# Patient Record
Sex: FEMALE | Race: BLACK OR AFRICAN AMERICAN | NOT HISPANIC OR LATINO | URBAN - METROPOLITAN AREA
[De-identification: names, ages, dates, MRNs, and addresses within clinical notes are randomized per-mention and may not be internally consistent; named-entity substitution may affect disease eponyms.]

---

## 2017-06-30 ENCOUNTER — IMPORTED ENCOUNTER (OUTPATIENT)
Dept: URBAN - METROPOLITAN AREA CLINIC 1 | Facility: CLINIC | Age: 62
End: 2017-06-30

## 2017-06-30 PROBLEM — H16.143: Noted: 2017-06-30

## 2017-06-30 PROBLEM — H25.813: Noted: 2017-06-30

## 2017-06-30 PROBLEM — H40.1131: Noted: 2017-06-30

## 2017-06-30 PROBLEM — H04.123: Noted: 2017-06-30

## 2017-06-30 PROCEDURE — 92014 COMPRE OPH EXAM EST PT 1/>: CPT

## 2017-06-30 PROCEDURE — 92015 DETERMINE REFRACTIVE STATE: CPT

## 2017-06-30 NOTE — PATIENT DISCUSSION
1.  Cataract OU: Observe for now without intervention. The patient was advised to contact us if any change or worsening of vision2. MICHAEL w/ PEK OU- Increase ATs to TID OU routinely 3. Mild Open Angle Glaucoma OU (CD 0.7 OU) IOP stable on current meds. Cont Latanoprost QHS OU. Compliance with meds. Letter to PCPMRx for glassesReturn for an appointment in 6 mo 10 dfe glare (Consider Phaco) with Dr. Jael Maynard.

## 2017-10-04 NOTE — PATIENT DISCUSSION
The patient feels that the cataract is significantly impacting daily activities and has elected cataract surgery. The risks, benefits, and alternatives to surgery were discussed. The patient elects to proceed with surgery.  Pt elects Symfony IOL goal david, reviewed blended offset, reviewed design of Symfony IOL and less than 1% failure of neuroadaptation requiring IOL xchange to Custom.

## 2017-10-04 NOTE — PATIENT DISCUSSION
Discussed collagen cross-linking. Unfortunately, this is not FDA approved within the [place] and is only available through studies.

## 2017-10-04 NOTE — PATIENT DISCUSSION
Advised that the presence of an epiretinal membrane may occasionally worsen or lead to macular edema following cataract surgery.

## 2017-10-04 NOTE — PATIENT DISCUSSION
Patient understands increased risk of persistent corneal edema after cataract surgery which may require corneal transplantation/endothelial keratoplasty.

## 2017-10-04 NOTE — PATIENT DISCUSSION
Certain medications including Flomax and other similar medications may cause the iris to become floppy during cataract surgery and may add complexity to the surgery and increase the risk for complications.

## 2017-10-04 NOTE — PATIENT DISCUSSION
Discussed the difficulty of selecting IOL's in previous refractive surgery patients and discussed the possibility of refractive surprise and possible IOL exchange., EpiLASEK for enhancement due to hx LASIK.

## 2017-12-08 ENCOUNTER — IMPORTED ENCOUNTER (OUTPATIENT)
Dept: URBAN - METROPOLITAN AREA CLINIC 1 | Facility: CLINIC | Age: 62
End: 2017-12-08

## 2017-12-08 PROBLEM — H40.1131: Noted: 2017-12-08

## 2017-12-08 PROBLEM — H40.1132: Noted: 2017-12-08

## 2017-12-08 PROBLEM — H25.813: Noted: 2017-12-08

## 2017-12-08 PROBLEM — H04.123: Noted: 2017-12-08

## 2017-12-08 PROBLEM — H16.143: Noted: 2017-12-08

## 2017-12-08 PROCEDURE — 92015 DETERMINE REFRACTIVE STATE: CPT

## 2017-12-08 PROCEDURE — 99214 OFFICE O/P EST MOD 30 MIN: CPT

## 2018-02-08 ENCOUNTER — IMPORTED ENCOUNTER (OUTPATIENT)
Dept: URBAN - METROPOLITAN AREA CLINIC 1 | Facility: CLINIC | Age: 63
End: 2018-02-08

## 2018-02-08 PROBLEM — H25.812: Noted: 2018-02-08

## 2018-02-08 PROCEDURE — 92136 OPHTHALMIC BIOMETRY: CPT

## 2018-02-08 NOTE — PATIENT DISCUSSION
Cataract OS: Visually Significant secondary to glare discussed the risks benefits alternatives and limitations of cataract surgery. The patient stated a full understanding and a desire to proceed with the procedure. The patient will need to start pre-operative eye drops as directed. Proceed w/ phaco PCL OS w/ iStentDiscussed with patient and patient understands halos around lights and glare are expected post-op particularly at night with the MF lens choice. Pt understood. Return for an appointment for sx OS with Dr. Dav Stone.

## 2018-02-14 ENCOUNTER — IMPORTED ENCOUNTER (OUTPATIENT)
Dept: URBAN - METROPOLITAN AREA CLINIC 1 | Facility: CLINIC | Age: 63
End: 2018-02-14

## 2018-02-15 ENCOUNTER — IMPORTED ENCOUNTER (OUTPATIENT)
Dept: URBAN - METROPOLITAN AREA CLINIC 1 | Facility: CLINIC | Age: 63
End: 2018-02-15

## 2018-02-15 PROBLEM — Z96.1: Noted: 2018-02-15

## 2018-02-15 PROCEDURE — 99024 POSTOP FOLLOW-UP VISIT: CPT

## 2018-02-15 NOTE — PATIENT DISCUSSION
POD#1 CE/IOL OS (Symfony MF- ZXROO w/ iStent) doing well. Continue all 3 gtts as prescribed and until gone. Use Lotemax BID OS Prolensa Qdaily OS Ocuflox TID OS Cont Latanoprost QHS OU Post op Warnings Reiterated RTC as scheduled

## 2018-02-22 ENCOUNTER — IMPORTED ENCOUNTER (OUTPATIENT)
Dept: URBAN - METROPOLITAN AREA CLINIC 1 | Facility: CLINIC | Age: 63
End: 2018-02-22

## 2018-02-22 PROBLEM — H25.811: Noted: 2018-02-22

## 2018-02-22 PROCEDURE — 92136 OPHTHALMIC BIOMETRY: CPT

## 2018-02-22 NOTE — PATIENT DISCUSSION
1.  Cataract OD: Visually Significant secondary to glare discussed the risks benefits alternatives and limitations of cataract surgery. The patient stated a full understanding and a desire to proceed with the procedure. Discussed with patient and patient understands halos around lights and glare are expected post-op particularly at night with the MF lens choice. Pt understood. Phaco PCL OD Symfony MF w/ iStent 2. POW#2  CE/IOL OS (Symfony MF- ZXROO w/ iStent) doing well.   Use Lotemax BID OS till out Use Prolensa Qdaily OS till out Cont Latanoprost QHS OU F/u as scheduled 2nd eye

## 2018-02-28 ENCOUNTER — IMPORTED ENCOUNTER (OUTPATIENT)
Dept: URBAN - METROPOLITAN AREA CLINIC 1 | Facility: CLINIC | Age: 63
End: 2018-02-28

## 2018-03-01 ENCOUNTER — IMPORTED ENCOUNTER (OUTPATIENT)
Dept: URBAN - METROPOLITAN AREA CLINIC 1 | Facility: CLINIC | Age: 63
End: 2018-03-01

## 2018-03-01 PROBLEM — Z96.1: Noted: 2018-03-01

## 2018-03-01 PROCEDURE — 99024 POSTOP FOLLOW-UP VISIT: CPT

## 2018-03-01 NOTE — PATIENT DISCUSSION
1. POD#1 CE/IOL Symfony ZXROO w/ iStent OD doing well. IOP improved. Continue Latanoprost OU QHSContinue all 3 gtts as prescribed and until gone. Ocuflox TIDLotemax BIDProlensa QDPost op Warnings Reiterated  2. POW#2  CE/IOL Symfony ZXROO w/ iStent OD OS doing well. Continue Lotemax BID until gone. Continue Prolensa QD until gone. 3.   RTC as scheduled

## 2018-03-23 ENCOUNTER — IMPORTED ENCOUNTER (OUTPATIENT)
Dept: URBAN - METROPOLITAN AREA CLINIC 1 | Facility: CLINIC | Age: 63
End: 2018-03-23

## 2018-03-23 PROBLEM — Z96.1: Noted: 2018-03-23

## 2018-03-23 PROCEDURE — 99024 POSTOP FOLLOW-UP VISIT: CPT

## 2018-03-23 NOTE — PATIENT DISCUSSION
POW#3 Phaco/ PCL OU (Anthony MF-ZXROO w/ iStent OU) Doing well Finish PO meds per schedule Cont Latanoprost QHS OU Return for an appointment in 3-4 mo 30 VF 24-2 OU with Dr. Jael Maynard.

## 2018-06-29 ENCOUNTER — IMPORTED ENCOUNTER (OUTPATIENT)
Dept: URBAN - METROPOLITAN AREA CLINIC 1 | Facility: CLINIC | Age: 63
End: 2018-06-29

## 2018-06-29 PROBLEM — H16.143: Noted: 2018-06-29

## 2018-06-29 PROBLEM — H40.1121: Noted: 2018-06-29

## 2018-06-29 PROBLEM — Z96.1: Noted: 2018-06-29

## 2018-06-29 PROBLEM — H40.1112: Noted: 2018-06-29

## 2018-06-29 PROBLEM — H04.123: Noted: 2018-06-29

## 2018-06-29 PROCEDURE — 92014 COMPRE OPH EXAM EST PT 1/>: CPT

## 2018-06-29 PROCEDURE — 92083 EXTENDED VISUAL FIELD XM: CPT

## 2018-06-29 NOTE — PATIENT DISCUSSION
1. COAG OU- Mod OD/ Mild OS (CD 0.7/0.75) VF shows no progression OU. IOP stable on current gtt. Cont Latanoprost QHS OU. Compliance with meds discussed. Compliance with meds discussed. 2.  MICHAEL w/ PEK OU- Cont ATs TID OU routinely. 3.  Pseudophakia OU - (Symfony MF- ZXROO OU) H/o iStent OU Letter to [employer] for an appointment in 6 mo 10 OCT with Dr. Judith Freeman.

## 2018-12-20 ENCOUNTER — IMPORTED ENCOUNTER (OUTPATIENT)
Dept: URBAN - METROPOLITAN AREA CLINIC 1 | Facility: CLINIC | Age: 63
End: 2018-12-20

## 2018-12-20 PROBLEM — H40.1112: Noted: 2018-12-20

## 2018-12-20 PROBLEM — H40.1121: Noted: 2018-12-20

## 2018-12-20 PROCEDURE — 92133 CPTRZD OPH DX IMG PST SGM ON: CPT

## 2018-12-20 PROCEDURE — 92012 INTRM OPH EXAM EST PATIENT: CPT

## 2018-12-20 NOTE — PATIENT DISCUSSION
1. COAG OU- Mod OD/ Mild OS (CD 0.7/0.75) OCT shows no progression OU. IOP stable on current gtt. Cont Latanoprost QHS OU. Compliance with meds discussed. Compliance with meds discussed. 2.  MICHAEL w/ PEK OU- Cont ATs TID OU routinely. 3.  Pseudophakia OU - (Symfony MF- ZXROO OU) H/o iStent OU4. PCO OU- very mild. observe. Return for an appointment in 6 mo 30 VF 24-2 OU glare with Dr. Neha Maddox.

## 2019-02-08 NOTE — PROCEDURE NOTE: CLINICAL
PROCEDURE NOTE: Epilation #1 OD. Diagnosis: Trichiasis without Entropion. Anesthesia: Topical. Prior to treatment, the risks/benefits/alternatives were discussed. The patient wished to proceed with procedure. Aberrant lashes removed from * lid(s) using microforcep. Patient tolerated procedure well. There were no complications. Post-op instructions given. Riley Dela Cruz

## 2019-06-21 ENCOUNTER — IMPORTED ENCOUNTER (OUTPATIENT)
Dept: URBAN - METROPOLITAN AREA CLINIC 1 | Facility: CLINIC | Age: 64
End: 2019-06-21

## 2019-06-21 PROBLEM — H40.1112: Noted: 2019-06-21

## 2019-06-21 PROBLEM — H40.1121: Noted: 2019-06-21

## 2019-06-21 PROBLEM — H04.123: Noted: 2019-06-21

## 2019-06-21 PROBLEM — H26.493: Noted: 2019-06-21

## 2019-06-21 PROBLEM — Z96.1: Noted: 2019-06-21

## 2019-06-21 PROBLEM — H16.143: Noted: 2019-06-21

## 2019-06-21 PROCEDURE — 92014 COMPRE OPH EXAM EST PT 1/>: CPT

## 2019-06-21 PROCEDURE — 92015 DETERMINE REFRACTIVE STATE: CPT

## 2019-06-21 PROCEDURE — 92083 EXTENDED VISUAL FIELD XM: CPT

## 2019-06-21 NOTE — PATIENT DISCUSSION
1.  Moderate Open Angle Glaucoma OD/Mild OS (CD 0.75 OU) - HVF stable OD WNL OS. IOP stable cont Latanoprost QHS OU. Patient advised to be compliant with gtts. 2.  MICHAEL w/ PEK OU- Recommend ATs TID OU routinely 3. PCO OU- Visually Significant secondary to glare; schedule YAG Cap OD then OS. Pros cons risks and benefits. 4.  Pseudophakia OU - (Symfony MF- ZXROO OU) H/o iStent OUReturn for an appointment in YAG Cap OD then OS with Dr. Ashu Rodas.

## 2019-06-24 NOTE — PATIENT DISCUSSION
4 days PO: Patient is doing well post-operatively. The importance of post-op drop compliance was emphasized. Drop schedule reviewed with patient. Patient to call if any visual changes or concerns.

## 2019-07-11 NOTE — PATIENT DISCUSSION
DIsc that rebound inflammation today. Needs to restart on Lotemax QID until tuesday and then recheck with Brenda Beasley. Disc that lotemax is not as strong and needs long taper after both eyes and poss Pred with IOP medication if NI on lotemax alone.

## 2019-07-17 NOTE — PATIENT DISCUSSION
[facility] in [facility] (flying) for his sons orientation.  Consider stronger steroid with IOP reducing medication along with steroid.

## 2019-07-17 NOTE — PATIENT DISCUSSION
Clinically the eye looks better (less cell), but only feels slightly better subjectively.  Recommend he use Lotemax q2h today.  Will be seeing Dr Carly Thomas for surgery OS tomorrow.

## 2019-07-18 NOTE — PATIENT DISCUSSION
[facility] in 5 days (flying) for his sons orientation.  Consider stronger steroid with IOP reducing medication along with steroid.

## 2019-07-18 NOTE — PATIENT DISCUSSION
[facility] in 5 days (flying) for his sons orientation.  Consider stronger steroid with IOP reducing medication along with steroid.

## 2019-07-18 NOTE — PATIENT DISCUSSION
Clinically the eye looks better (less cell), but only feels slightly better subjectively.  Recommend he use Lotemax q2h today.  Will be seeing Dr Abdias Cook for surgery OS tomorrow.

## 2019-07-18 NOTE — PATIENT DISCUSSION
Clinically the eye looks better (less cell), but only feels slightly better subjectively.  Recommend he use Lotemax q2h today.  Will be seeing Dr Larry Ball for surgery OS tomorrow.

## 2019-07-19 ENCOUNTER — IMPORTED ENCOUNTER (OUTPATIENT)
Dept: URBAN - METROPOLITAN AREA CLINIC 1 | Facility: CLINIC | Age: 64
End: 2019-07-19

## 2019-07-19 PROBLEM — H26.491: Noted: 2019-07-19

## 2019-07-19 PROCEDURE — 66821 AFTER CATARACT LASER SURGERY: CPT

## 2019-07-19 NOTE — PATIENT DISCUSSION
Clinically the eye looks better (less cell), but only feels slightly better subjectively.  Recommend he use Lotemax q2h today.  Will be seeing Dr Chriss Mirza for surgery OS tomorrow.

## 2019-07-19 NOTE — PATIENT DISCUSSION
YAG CAP OD: (Consent signed and scanned into attachments) 1 gtt Prolensa applied. The purpose and nature of the procedure possible alternative methods of treatment the risks involved and the possibility of complications were discussed with patient. The Patient wishes to proceed and the consent was signed. The laser was then performed under topical anesthesia with no complications. Post op instructions were given to patient as well as a follow-up appointment. Patient was advised to call our office if any questions or concerns. Return for an appointment for Return as scheduled with Dr. Judith Freeman.

## 2019-07-19 NOTE — PATIENT DISCUSSION
[facility] in 5 days (flying) for his sons orientation.  Consider stronger steroid with IOP reducing medication along with steroid.

## 2019-08-02 ENCOUNTER — IMPORTED ENCOUNTER (OUTPATIENT)
Dept: URBAN - METROPOLITAN AREA CLINIC 1 | Facility: CLINIC | Age: 64
End: 2019-08-02

## 2019-08-02 PROBLEM — H26.492: Noted: 2019-08-02

## 2019-08-02 PROCEDURE — 66821 AFTER CATARACT LASER SURGERY: CPT

## 2019-08-02 NOTE — PATIENT DISCUSSION
YAG CAP OS: (Consent signed and scanned into attachments) 1 gtt Prolensa applied. The purpose and nature of the procedure possible alternative methods of treatment the risks involved and the possibility of complications were discussed with patient. The Patient wishes to proceed and the consent was signed. The laser was then performed under topical anesthesia with no complications. Post op instructions were given to patient as well as a follow-up appointment. Patient was advised to call our office if any questions or concerns. Return for an appointment in 1-8 week po/yag with Dr. Thalia Vargas.

## 2019-08-12 NOTE — PATIENT DISCUSSION
Will need enhancement when MR and inflammation stable.  At this time appears that -1.25 OD for emmetropia and -1.50 OS for -1.50 residual myopia would be best.

## 2019-09-13 ENCOUNTER — IMPORTED ENCOUNTER (OUTPATIENT)
Dept: URBAN - METROPOLITAN AREA CLINIC 1 | Facility: CLINIC | Age: 64
End: 2019-09-13

## 2019-09-13 PROBLEM — H26.40: Noted: 2019-09-13

## 2019-09-13 PROCEDURE — 99024 POSTOP FOLLOW-UP VISIT: CPT

## 2019-09-13 NOTE — PATIENT DISCUSSION
PO YAG OU: good result d/c NSAID. Return for an appointment in 3 months for a 10/OCT with Dr. Cristela Barahona.

## 2019-09-24 NOTE — PATIENT DISCUSSION
Will need enhancement after YAG OU.  Emmetropia OD and -1.50 OS.  He prefers arms length for near.  CL trial prn.

## 2019-10-02 NOTE — PROCEDURE NOTE: SURGICAL
Prior to commencing surgery, Dr. Elidia Turner confirmed patient identification, surgical procedure, site and side. Following topical proparacaine anesthesia, the patient was positioned at the YAG laser, a contact lens was coupled to the cornea of the right eye with methylcellulose and an axial posterior capsulotomy was performed without complication using 3.0 Mj x 31. Attention was turned to the left eye and a contact lens was coupled to the cornea of the left eye with methylcellulose and an axial posterior capsulotomy was performed without complication using 3.1 Mj x 61. Excess methylcellulose was washed from both eyes, one drop of Alphagan was instilled in each eye and the patient returned to the holding area having tolerated the procedure well and without complication. <br />[id number]<SW /><br />

## 2019-10-02 NOTE — PATIENT DISCUSSION
Will need enhancement after YAG OU.  Emmetropia OD and -1.50 OS.  He prefers arms length for near.  CL trial will need to be completed after yag.  Please send chart to [facility] to review for approval of LVC after CTL Trial.

## 2019-10-02 NOTE — PATIENT DISCUSSION
Will need enhancement after YAG OU.  Emmetropia OD and -1.50 OS.  He prefers arms length for near.  CL trial will need to be completed after yag.  Please send chart to [facility] to review for approval of LVC after CTL Trial.

## 2019-10-11 NOTE — PATIENT DISCUSSION
Will need enhancement after YAG OU.  Emmetropia OD and -1.50 OS.  He prefers arms length for near.  CL trial will need to be completed after yag.  Please send chart to [facility] to review for approval of LVC after CTL Trial.

## 2019-10-11 NOTE — PATIENT DISCUSSION
DIsc placed BLL plugs today to help get better outcome with Lasik. Pt is still having flucuation of vision using tears 6+ a day.

## 2019-10-11 NOTE — PROCEDURE NOTE: CLINICAL
PROCEDURE NOTE: Punctal Plugs, Jennifer Plant ([id number], [id number]) #2 Bilateral Lower Lids. 0.5mm Quintess.

## 2019-11-14 NOTE — PATIENT DISCUSSION
Removed BCL today without complication. Doing well. AT every hour. Taper PA TID OU for 1 week then BID 1 week then QD 1 week.

## 2019-11-15 NOTE — PATIENT DISCUSSION
"Advised to take Omega 3 fatty acids, like Flaxseed Oil, in supplements. 3,000 mg a day. (Takes 2 months to ""kick in."") Recommended warm compresses with microwavable eye mask 10 minutes once a day. (Takes 2 weeks to ""kick in.""). "

## 2019-11-22 NOTE — PATIENT DISCUSSION
Disc will cont to improve with time. Use At aggressively. Gave temporary rx to help with distance for now. Will follow closely.

## 2020-01-10 ENCOUNTER — IMPORTED ENCOUNTER (OUTPATIENT)
Dept: URBAN - METROPOLITAN AREA CLINIC 1 | Facility: CLINIC | Age: 65
End: 2020-01-10

## 2020-01-10 PROBLEM — H40.1112: Noted: 2020-01-10

## 2020-01-10 PROBLEM — H40.1121: Noted: 2020-01-10

## 2020-01-10 PROCEDURE — 92133 CPTRZD OPH DX IMG PST SGM ON: CPT

## 2020-01-10 PROCEDURE — 92012 INTRM OPH EXAM EST PATIENT: CPT

## 2020-01-10 NOTE — PATIENT DISCUSSION
1.  Moderate Open Angle Glaucoma OD/Mild OS (CD 0.75 OU) - Slight progression shown on OCT. IOP increased with non compliance cont Latanoprost QHS OU. Patient advised to be compliant with gtts. **Add gtt with any future progression. 2.  MICHAEL w/ PEK OU- Recommend ATs TID OU routinely 3. PCO OU- Visually Significant secondary to glare; schedule YAG Cap OD then OS. Pros cons risks and benefits. 4.  Pseudophakia OU - (Symfony MF- ZXROO OU) H/o iStent OUReturn for an appointment in 6 monhs 30/HVF with Dr. Vidya Jang.

## 2020-01-10 NOTE — PATIENT DISCUSSION
/Mild OS (CD 0.75 OU) - Slight progression shown on OCT. IOP increased with non compliance cont Latanoprost QHS OU. Patient advised to be compliant with gtts. **Add gtt with any future progression. 2.  MICHAEL w/ PEK OU- Recommend ATs TID OU routinely 3. PCO OU- Visually Significant secondary to glare; schedule YAG Cap OD then OS. Pros cons risks and benefits. 4.  Pseudophakia OU - (Symfony MF- ZXROO OU) H/o iStent OUReturn for an appointment in 6 monhs 30/HVF with Dr. Rl Mauricio.

## 2020-01-20 NOTE — PATIENT DISCUSSION
Long disc with patient re: prolonged visual recovery. Reassured pt that [place] will continue to improve.

## 2020-05-04 NOTE — PATIENT DISCUSSION
if patient is still bothered by near Vision following EPI LASEK in the right eye then REC EPI LASEK Near goal TBD.

## 2020-05-04 NOTE — PATIENT DISCUSSION
Patient informed of available non-opioid medications and other non-pharmacological options. Discussed advantages and disadvantages of the alternatives, including whether the patient is at-risk for, or has a history of, controlled substance abuse or misuse, and the patient’s personal preferences. [facility] “Opioid Alternative Pamphlet” was provided to patient.

## 2020-05-07 NOTE — PATIENT DISCUSSION
Patient informed of available non-opioid medications and other non-pharmacological options. Discussed advantages and disadvantages of the alternatives, including whether the patient is at-risk for, or has a history of, controlled substance abuse or misuse, and the patient’s personal preferences. [facility] “Opioid Alternative Pamphlet” was provided to patient.

## 2020-05-08 NOTE — PATIENT DISCUSSION
Patient informed of available non-opioid medications and other non-pharmacological options. Discussed advantages and disadvantages of the alternatives, including whether the patient is at-risk for, or has a history of, controlled substance abuse or misuse, and the patient’s personal preferences. [facility] “Opioid Alternative Pamphlet” was provided to patient.

## 2020-05-14 NOTE — PATIENT DISCUSSION
Patient informed of available non-opioid medications and other non-pharmacological options. Discussed advantages and disadvantages of the alternatives, including whether the patient is at-risk for, or has a history of, controlled substance abuse or misuse, and the patient’s personal preferences. [facility] “Opioid Alternative Pamphlet” was provided to patient.

## 2020-06-17 NOTE — PROCEDURE NOTE: CLINICAL
PROCEDURE NOTE: Punctal Plugs, Sumit Reyna ([id number], [id number]) #1 Left Lower Lid. Prior to treatment, the risks/benefits/alternatives were discussed. The patient wished to proceed with procedure. Temporary collagen plugs were inserted. Patient tolerated procedure well. There were no complications. Post procedure instructions given. Juan Manuel Pisano PROCEDURE NOTE: Punctal Plugs, Elliott Dissolvable ([id number], [id number]) #1 Right Lower Lid. Prior to treatment, the risks/benefits/alternatives were discussed. The patient wished to proceed with procedure. Temporary collagen plugs were inserted. Patient tolerated procedure well. There were no complications. Post procedure instructions given. Juan Manuel Pisano

## 2020-10-20 ENCOUNTER — IMPORTED ENCOUNTER (OUTPATIENT)
Dept: URBAN - METROPOLITAN AREA CLINIC 1 | Facility: CLINIC | Age: 65
End: 2020-10-20

## 2020-10-20 PROBLEM — Z96.1: Noted: 2020-10-20

## 2020-10-20 PROBLEM — H16.143: Noted: 2020-10-20

## 2020-10-20 PROBLEM — H40.1132: Noted: 2020-10-20

## 2020-10-20 PROBLEM — H04.123: Noted: 2020-10-20

## 2020-10-20 PROCEDURE — 92083 EXTENDED VISUAL FIELD XM: CPT

## 2020-10-20 PROCEDURE — 92014 COMPRE OPH EXAM EST PT 1/>: CPT

## 2021-04-20 ENCOUNTER — IMPORTED ENCOUNTER (OUTPATIENT)
Dept: URBAN - METROPOLITAN AREA CLINIC 1 | Facility: CLINIC | Age: 66
End: 2021-04-20

## 2021-04-20 PROBLEM — H40.1132: Noted: 2021-04-20

## 2021-04-20 PROCEDURE — 99213 OFFICE O/P EST LOW 20 MIN: CPT

## 2021-04-20 PROCEDURE — 92133 CPTRZD OPH DX IMG PST SGM ON: CPT

## 2021-04-20 NOTE — PATIENT DISCUSSION
MICHAEL w/ PEK OU- Recommend increase ATs TID OU routinely 3. Pseudophakia OU - (Symfony MF- ZXROO OU w/ iStent OU) H/o Yag Cap OUReturn for an appointment in 6 months 30/HVF with Dr. Monica Petty.

## 2021-04-20 NOTE — PATIENT DISCUSSION
1.  Moderate Open Angle Glaucoma OU (CD 0.75 OU) - No progression by OCT. IOP stable continue Latanoprost QHS OU. S/p iStent OU. Patient advised to be compliant with gtts. 2.  MICHAEL w/ PEK OU- Recommend increase ATs TID OU routinely 3. Pseudophakia OU - (Symfony MF- ZXROO OU w/ iStent OU) H/o Yag Cap OUReturn for an appointment in 6 months 30/HVF with Dr. Koby العلي.

## 2021-06-21 NOTE — PROCEDURE NOTE: CLINICAL
PROCEDURE NOTE: Punctal Plugs, [place] Jacinto ([id number], [id number]) #1 Left Lower Lid. Prior to treatment, the risks/benefits/alternatives were discussed. The patient wished to proceed with procedure. Temporary collagen plugs were inserted. Patient tolerated procedure well. There were no complications. Post procedure instructions given. Praveen Pepe PROCEDURE NOTE: Punctal Plugs, Quintess Dissolvable ([id number], [id number]) #1 Right Lower Lid. Prior to treatment, the risks/benefits/alternatives were discussed. The patient wished to proceed with procedure. Temporary collagen plugs were inserted. Patient tolerated procedure well. There were no complications. Post procedure instructions given. Praveen Pepe

## 2021-06-21 NOTE — PATIENT DISCUSSION
Placed punctal plugs BLL. --poss restasis BID in the future if worsening. Good control with plugs so far.

## 2021-11-08 ENCOUNTER — IMPORTED ENCOUNTER (OUTPATIENT)
Dept: URBAN - METROPOLITAN AREA CLINIC 1 | Facility: CLINIC | Age: 66
End: 2021-11-08

## 2021-11-08 PROBLEM — Z96.1: Noted: 2021-11-08

## 2021-11-08 PROBLEM — H40.1132: Noted: 2021-11-08

## 2021-11-08 PROBLEM — H04.123: Noted: 2021-11-08

## 2021-11-08 PROBLEM — H16.143: Noted: 2021-11-08

## 2021-11-08 PROCEDURE — 99214 OFFICE O/P EST MOD 30 MIN: CPT

## 2021-11-08 PROCEDURE — 92083 EXTENDED VISUAL FIELD XM: CPT

## 2021-11-08 NOTE — PATIENT DISCUSSION
1.  Moderate Open Angle Glaucoma OU -- (CD 0.75 OU) HVF shows early superior & inferior Arcuate OD early superior arcuate OS. IOP stable continue Latanoprost QHS OU. S/p iStent OU. **Add gtts with any future progression** Patient advised to be compliant with gtts. 2.  MICHAEL w/ PEK OU- Recommend increase ATs TID OU routinely 3. Pseudophakia OU - (Symfony MF- ZXROO OU w/ iStent OU) H/o Yag Cap OUPatient deferred MRx at today's visit. Return for an appointment in 6 months 10/OCT with Dr. Koby العلي.

## 2021-12-06 NOTE — PATIENT DISCUSSION
Continue current management.
Lotemax bid for 2 wks, then qd for 2 weeks, then stop.
See #1.
Stable.
Stop Lotemax.
Will need YAG OS and possibly OD also.
Will need enhancement when MR and inflammation stable.  At this time appears that -1.25 OD for emmetropia and -1.50 OS for -1.50 residual myopia would be best.
monitor.
not examined

## 2022-04-02 ASSESSMENT — KERATOMETRY
OD_AXISANGLE2_DEGREES: 090
OD_K2POWER_DIOPTERS: 41.75
OS_AXISANGLE_DEGREES: 166
OD_AXISANGLE_DEGREES: 180
OS_K2POWER_DIOPTERS: 42.00
OS_K1POWER_DIOPTERS: 41.50
OS_AXISANGLE2_DEGREES: 076
OD_K1POWER_DIOPTERS: 41.50

## 2022-04-02 ASSESSMENT — VISUAL ACUITY
OS_SC: J2
OD_GLARE: 20/400
OS_GLARE: 20/400
OS_CC: 20/20
OD_SC: J2
OS_CC: 20/20
OU_SC: J2
OS_CC: 20/30
OD_CC: 20/20
OD_GLARE: 20/60
OD_SC: J1
OS_CC: 20/20
OS_SC: J1
OS_SC: J2
OS_SC: J2
OS_CC: 20/20
OS_CC: 20/20-1
OS_CC: 20/25
OD_GLARE: 20/60
OD_CC: 20/25+2
OD_CC: 20/25-1
OD_CC: 20/20
OS_GLARE: 20/60
OD_CC: 20/30
OS_CC: 20/20
OD_CC: 20/20
OS_CC: 20/50
OS_CC: 20/30
OD_SC: J1
OS_CC: 20/40
OD_CC: 20/30
OS_CC: 20/20
OS_GLARE: 20/100
OD_CC: 20/20-2
OD_CC: 20/40
OS_CC: 20/25
OS_CC: 20/30+2

## 2022-04-02 ASSESSMENT — TONOMETRY
OD_IOP_MMHG: 15
OS_IOP_MMHG: 15
OS_IOP_MMHG: 15
OS_IOP_MMHG: 20
OD_IOP_MMHG: 14
OS_IOP_MMHG: 15
OS_IOP_MMHG: 14
OS_IOP_MMHG: 15
OS_IOP_MMHG: 17
OD_IOP_MMHG: 15
OS_IOP_MMHG: 15
OD_IOP_MMHG: 14
OD_IOP_MMHG: 15
OS_IOP_MMHG: 14
OD_IOP_MMHG: 16
OD_IOP_MMHG: 20
OD_IOP_MMHG: 17
OD_IOP_MMHG: 18
OD_IOP_MMHG: 15
OS_IOP_MMHG: 15
OD_IOP_MMHG: 13
OS_IOP_MMHG: 20
OS_IOP_MMHG: 15
OD_IOP_MMHG: 15
OD_IOP_MMHG: 15

## 2022-05-09 ENCOUNTER — FOLLOW UP (OUTPATIENT)
Dept: URBAN - METROPOLITAN AREA CLINIC 1 | Facility: CLINIC | Age: 67
End: 2022-05-09

## 2022-05-09 DIAGNOSIS — H16.143: ICD-10-CM

## 2022-05-09 DIAGNOSIS — Z96.1: ICD-10-CM

## 2022-05-09 DIAGNOSIS — H40.1132: ICD-10-CM

## 2022-05-09 DIAGNOSIS — H04.123: ICD-10-CM

## 2022-05-09 PROCEDURE — 92133 CPTRZD OPH DX IMG PST SGM ON: CPT

## 2022-05-09 PROCEDURE — 99214 OFFICE O/P EST MOD 30 MIN: CPT

## 2022-05-09 ASSESSMENT — VISUAL ACUITY
OU_SC: J2
OS_SC: J2
OD_SC: 20/20-1
OS_SC: 20/20
OD_SC: J2
OU_SC: 20/20

## 2022-05-09 ASSESSMENT — TONOMETRY
OD_IOP_MMHG: 15
OS_IOP_MMHG: 14

## 2022-05-09 NOTE — PATIENT DISCUSSION
(CD 0.75 OU) No significant change by OCT. IOP stable. Continue Latanoprost QHS OU. Patient to continue with current gtt regimen. Patient advised to be compliant with gtts. Condition was discussed with patient and patient understands. Will continue to monitor patient for any progression in condition. Patient was advised to call us with any problems, questions, or concerns.

## 2022-07-21 NOTE — PROCEDURE NOTE: CLINICAL
PROCEDURE NOTE: Punctal Plugs, Iris Minaya ([id number], [id number]) #1 Left Lower Lid. Prior to treatment, the risks/benefits/alternatives were discussed. The patient wished to proceed with procedure. Temporary collagen plugs were inserted. Patient tolerated procedure well. There were no complications. Post procedure instructions given. Cadence Dueñas PROCEDURE NOTE: Punctal Plugs, Edwards Dissolvable ([id number], [id number]) #1 Right Lower Lid. Prior to treatment, the risks/benefits/alternatives were discussed. The patient wished to proceed with procedure. Temporary collagen plugs were inserted. Patient tolerated procedure well. There were no complications. Post procedure instructions given. Cadence Dueñas

## 2022-10-06 ENCOUNTER — FOLLOW UP (OUTPATIENT)
Dept: URBAN - METROPOLITAN AREA CLINIC 1 | Facility: CLINIC | Age: 67
End: 2022-10-06

## 2022-10-06 DIAGNOSIS — R51.9: ICD-10-CM

## 2022-10-06 DIAGNOSIS — H40.1132: ICD-10-CM

## 2022-10-06 PROCEDURE — 99213 OFFICE O/P EST LOW 20 MIN: CPT

## 2022-10-06 ASSESSMENT — TONOMETRY
OS_IOP_MMHG: 14
OD_IOP_MMHG: 14

## 2022-10-06 ASSESSMENT — VISUAL ACUITY
OD_SC: 20/25
OS_SC: 20/20

## 2022-10-06 NOTE — PATIENT DISCUSSION
Patient reports that she was seen by her PCP due to headaches caused by her blood pressure medication. She notes that she was sent here today for an IOP check. IOP today is 14 OU. Recommend patient follow-up with PCP for blood pressure control. Discussed with the patient that if IOP elevates due to BP medication, will consider changing glaucoma drops/ adding medication.

## 2022-10-06 NOTE — PATIENT DISCUSSION
(CD 0.75 OU) IOP 14 OU. Advised the patient to continue latanoprost QHS OU, and stressed drop compliance.  Will continue to monitor patient for any progression in condition. Patient was advised to call us with any problems, questions, or concerns. F/u 11/11/2022 as scheduled w/ PMG 1-0/ HVF24-2.

## 2022-11-11 ENCOUNTER — FOLLOW UP (OUTPATIENT)
Dept: URBAN - METROPOLITAN AREA CLINIC 1 | Facility: CLINIC | Age: 67
End: 2022-11-11

## 2022-11-11 DIAGNOSIS — H40.1132: ICD-10-CM

## 2022-11-11 PROCEDURE — 92083 EXTENDED VISUAL FIELD XM: CPT

## 2022-11-11 PROCEDURE — 92012 INTRM OPH EXAM EST PATIENT: CPT

## 2022-11-11 ASSESSMENT — VISUAL ACUITY
OU_SC: J1
OD_SC: 20/25
OS_SC: 20/20

## 2022-11-11 ASSESSMENT — TONOMETRY
OD_IOP_MMHG: 14
OS_IOP_MMHG: 14

## 2022-11-11 NOTE — PATIENT DISCUSSION
(CD 0.75 OU) HVF today show Non-specific loss OU. IOP 14 OU. Advised the patient to continue latanoprost QHS OU, and stressed drop compliance.  Will continue to monitor patient for any progression in condition. Patient was advised to call us with any problems, questions, or concerns. F/u 11/11/2022 as scheduled w/ PMG 30/OCT.

## 2023-05-19 ENCOUNTER — COMPREHENSIVE EXAM (OUTPATIENT)
Dept: URBAN - METROPOLITAN AREA CLINIC 1 | Facility: CLINIC | Age: 68
End: 2023-05-19

## 2023-05-19 DIAGNOSIS — H16.143: ICD-10-CM

## 2023-05-19 DIAGNOSIS — H04.123: ICD-10-CM

## 2023-05-19 DIAGNOSIS — H40.1132: ICD-10-CM

## 2023-05-19 DIAGNOSIS — Z96.1: ICD-10-CM

## 2023-05-19 PROCEDURE — 99214 OFFICE O/P EST MOD 30 MIN: CPT

## 2023-05-19 PROCEDURE — 92133 CPTRZD OPH DX IMG PST SGM ON: CPT

## 2023-05-19 ASSESSMENT — VISUAL ACUITY
OD_SC: 20/30
OS_CC: 20/20-1
OD_BAT: 20/40
OS_BAT: 20/50
OD_CC: J1+
OS_CC: J1+
OD_CC: 20/25
OS_SC: 20/20-1

## 2023-05-19 ASSESSMENT — TONOMETRY
OS_IOP_MMHG: 16
OD_IOP_MMHG: 15

## 2023-06-13 ENCOUNTER — COMPREHENSIVE EXAM (OUTPATIENT)
Dept: URBAN - METROPOLITAN AREA CLINIC 1 | Facility: CLINIC | Age: 68
End: 2023-06-13

## 2023-06-13 DIAGNOSIS — H52.13: ICD-10-CM

## 2023-06-13 DIAGNOSIS — H52.4: ICD-10-CM

## 2023-06-13 DIAGNOSIS — Z01.00: ICD-10-CM

## 2023-06-13 DIAGNOSIS — H52.223: ICD-10-CM

## 2023-06-13 PROCEDURE — 92015 DETERMINE REFRACTIVE STATE: CPT

## 2023-06-13 PROCEDURE — 92014 COMPRE OPH EXAM EST PT 1/>: CPT

## 2023-06-13 ASSESSMENT — TONOMETRY
OS_IOP_MMHG: 16
OD_IOP_MMHG: 16

## 2023-06-13 ASSESSMENT — VISUAL ACUITY
OD_SC: 20/40
OS_SC: J3
OS_BAT: 20/50
OS_SC: 20/25-2
OD_SC: J3
OD_BAT: 20/40

## 2023-11-30 ENCOUNTER — FOLLOW UP (OUTPATIENT)
Dept: URBAN - METROPOLITAN AREA CLINIC 1 | Facility: CLINIC | Age: 68
End: 2023-11-30

## 2023-11-30 DIAGNOSIS — H40.1132: ICD-10-CM

## 2023-11-30 PROCEDURE — 99213 OFFICE O/P EST LOW 20 MIN: CPT

## 2023-11-30 PROCEDURE — 92083 EXTENDED VISUAL FIELD XM: CPT

## 2023-11-30 ASSESSMENT — TONOMETRY
OD_IOP_MMHG: 16
OS_IOP_MMHG: 16

## 2023-11-30 ASSESSMENT — VISUAL ACUITY
OD_SC: 20/30-1
OS_SC: 20/20-2

## 2024-05-30 ENCOUNTER — COMPREHENSIVE EXAM (OUTPATIENT)
Dept: URBAN - METROPOLITAN AREA CLINIC 1 | Facility: CLINIC | Age: 69
End: 2024-05-30

## 2024-05-30 DIAGNOSIS — H43.812: ICD-10-CM

## 2024-05-30 DIAGNOSIS — H40.1132: ICD-10-CM

## 2024-05-30 DIAGNOSIS — H04.123: ICD-10-CM

## 2024-05-30 DIAGNOSIS — H16.143: ICD-10-CM

## 2024-05-30 PROCEDURE — 92133 CPTRZD OPH DX IMG PST SGM ON: CPT

## 2024-05-30 PROCEDURE — 99214 OFFICE O/P EST MOD 30 MIN: CPT

## 2024-05-30 ASSESSMENT — TONOMETRY
OS_IOP_MMHG: 15
OD_IOP_MMHG: 16

## 2024-05-30 ASSESSMENT — VISUAL ACUITY
OS_SC: 20/20-2
OD_SC: 20/25-2

## 2024-12-05 ENCOUNTER — FOLLOW UP (OUTPATIENT)
Age: 69
End: 2024-12-05

## 2024-12-05 DIAGNOSIS — H40.1132: ICD-10-CM

## 2024-12-05 PROCEDURE — 99213 OFFICE O/P EST LOW 20 MIN: CPT

## 2024-12-05 PROCEDURE — 92083 EXTENDED VISUAL FIELD XM: CPT

## 2024-12-27 ENCOUNTER — CLINIC PROCEDURE ONLY (OUTPATIENT)
Age: 69
End: 2024-12-27

## 2024-12-27 DIAGNOSIS — H40.1132: ICD-10-CM

## 2024-12-27 PROCEDURE — 65855 TRABECULOPLASTY LASER SURG: CPT

## 2025-01-10 ENCOUNTER — CLINIC PROCEDURE ONLY (OUTPATIENT)
Age: 70
End: 2025-01-10

## 2025-01-10 DIAGNOSIS — H40.1132: ICD-10-CM

## 2025-01-10 PROCEDURE — 65855 TRABECULOPLASTY LASER SURG: CPT

## 2025-01-27 ENCOUNTER — POST-OP (OUTPATIENT)
Age: 70
End: 2025-01-27

## 2025-01-27 DIAGNOSIS — Z98.890: ICD-10-CM

## 2025-01-27 PROCEDURE — 66999PO NON CO-MANAGED OTHER SURGERY PO

## 2025-06-12 ENCOUNTER — COMPREHENSIVE EXAM (OUTPATIENT)
Age: 70
End: 2025-06-12

## 2025-06-12 DIAGNOSIS — H43.812: ICD-10-CM

## 2025-06-12 DIAGNOSIS — H40.1132: ICD-10-CM

## 2025-06-12 DIAGNOSIS — Z96.1: ICD-10-CM

## 2025-06-12 DIAGNOSIS — H16.143: ICD-10-CM

## 2025-06-12 DIAGNOSIS — H04.123: ICD-10-CM

## 2025-06-12 PROCEDURE — 92015 DETERMINE REFRACTIVE STATE: CPT

## 2025-06-12 PROCEDURE — 99214 OFFICE O/P EST MOD 30 MIN: CPT

## 2025-06-12 PROCEDURE — 92133 CPTRZD OPH DX IMG PST SGM ON: CPT
